# Patient Record
Sex: FEMALE | Employment: UNEMPLOYED | URBAN - METROPOLITAN AREA
[De-identification: names, ages, dates, MRNs, and addresses within clinical notes are randomized per-mention and may not be internally consistent; named-entity substitution may affect disease eponyms.]

---

## 2024-04-03 ENCOUNTER — OFFICE VISIT (OUTPATIENT)
Facility: CLINIC | Age: 29
End: 2024-04-03
Payer: MEDICAID

## 2024-04-03 ENCOUNTER — HOSPITAL ENCOUNTER (OUTPATIENT)
Facility: HOSPITAL | Age: 29
Setting detail: SPECIMEN
Discharge: HOME | End: 2024-04-06
Payer: MEDICAID

## 2024-04-03 VITALS
RESPIRATION RATE: 16 BRPM | WEIGHT: 182 LBS | TEMPERATURE: 98.5 F | BODY MASS INDEX: 28.56 KG/M2 | HEIGHT: 67 IN | DIASTOLIC BLOOD PRESSURE: 76 MMHG | SYSTOLIC BLOOD PRESSURE: 110 MMHG | OXYGEN SATURATION: 98 % | HEART RATE: 84 BPM

## 2024-04-03 DIAGNOSIS — Z00.00 WELL WOMAN EXAM (NO GYNECOLOGICAL EXAM): Primary | ICD-10-CM

## 2024-04-03 DIAGNOSIS — Z76.89 ENCOUNTER TO ESTABLISH CARE: ICD-10-CM

## 2024-04-03 DIAGNOSIS — G89.29 CHRONIC BILATERAL THORACIC BACK PAIN: ICD-10-CM

## 2024-04-03 DIAGNOSIS — K59.04 CHRONIC IDIOPATHIC CONSTIPATION: ICD-10-CM

## 2024-04-03 DIAGNOSIS — M54.6 CHRONIC BILATERAL THORACIC BACK PAIN: ICD-10-CM

## 2024-04-03 DIAGNOSIS — Z00.00 WELL WOMAN EXAM (NO GYNECOLOGICAL EXAM): ICD-10-CM

## 2024-04-03 PROBLEM — J45.909 ASTHMA: Status: ACTIVE | Noted: 2019-01-21

## 2024-04-03 PROBLEM — B00.9 HSV-1 (HERPES SIMPLEX VIRUS 1) INFECTION: Status: ACTIVE | Noted: 2021-08-23

## 2024-04-03 PROBLEM — F41.9 ANXIETY: Status: ACTIVE | Noted: 2021-08-23

## 2024-04-03 LAB
ALBUMIN SERPL-MCNC: 4.3 G/DL (ref 3.4–5)
ALBUMIN/GLOB SERPL: 1.4 (ref 0.8–1.7)
ALP SERPL-CCNC: 68 U/L (ref 45–117)
ALT SERPL-CCNC: 32 U/L (ref 13–56)
ANION GAP SERPL CALC-SCNC: 4 MMOL/L (ref 3–18)
AST SERPL-CCNC: 22 U/L (ref 10–38)
BILIRUB SERPL-MCNC: 0.5 MG/DL (ref 0.2–1)
BUN SERPL-MCNC: 6 MG/DL (ref 7–18)
BUN/CREAT SERPL: 10 (ref 12–20)
CALCIUM SERPL-MCNC: 9.5 MG/DL (ref 8.5–10.1)
CHLORIDE SERPL-SCNC: 107 MMOL/L (ref 100–111)
CO2 SERPL-SCNC: 27 MMOL/L (ref 21–32)
CREAT SERPL-MCNC: 0.62 MG/DL (ref 0.6–1.3)
ERYTHROCYTE [DISTWIDTH] IN BLOOD BY AUTOMATED COUNT: 13.2 % (ref 11.6–14.5)
GLOBULIN SER CALC-MCNC: 3.1 G/DL (ref 2–4)
GLUCOSE SERPL-MCNC: 83 MG/DL (ref 74–99)
HCT VFR BLD AUTO: 45.9 % (ref 35–45)
HGB BLD-MCNC: 15.1 G/DL (ref 12–16)
MCH RBC QN AUTO: 28.3 PG (ref 24–34)
MCHC RBC AUTO-ENTMCNC: 32.9 G/DL (ref 31–37)
MCV RBC AUTO: 86.1 FL (ref 78–100)
NRBC # BLD: 0 K/UL (ref 0–0.01)
NRBC BLD-RTO: 0 PER 100 WBC
PLATELET # BLD AUTO: 166 K/UL (ref 135–420)
PMV BLD AUTO: 12.1 FL (ref 9.2–11.8)
POTASSIUM SERPL-SCNC: 4.2 MMOL/L (ref 3.5–5.5)
PROT SERPL-MCNC: 7.4 G/DL (ref 6.4–8.2)
RBC # BLD AUTO: 5.33 M/UL (ref 4.2–5.3)
SODIUM SERPL-SCNC: 138 MMOL/L (ref 136–145)
WBC # BLD AUTO: 4.7 K/UL (ref 4.6–13.2)

## 2024-04-03 PROCEDURE — 99385 PREV VISIT NEW AGE 18-39: CPT | Performed by: NURSE PRACTITIONER

## 2024-04-03 PROCEDURE — 36415 COLL VENOUS BLD VENIPUNCTURE: CPT

## 2024-04-03 PROCEDURE — 85027 COMPLETE CBC AUTOMATED: CPT

## 2024-04-03 PROCEDURE — 80053 COMPREHEN METABOLIC PANEL: CPT

## 2024-04-03 RX ORDER — MEDROXYPROGESTERONE ACETATE 150 MG/ML
150 INJECTION, SUSPENSION INTRAMUSCULAR
COMMUNITY
Start: 2024-01-19

## 2024-04-03 ASSESSMENT — PATIENT HEALTH QUESTIONNAIRE - PHQ9
SUM OF ALL RESPONSES TO PHQ QUESTIONS 1-9: 0
2. FEELING DOWN, DEPRESSED OR HOPELESS: NOT AT ALL
SUM OF ALL RESPONSES TO PHQ QUESTIONS 1-9: 0
SUM OF ALL RESPONSES TO PHQ QUESTIONS 1-9: 0
SUM OF ALL RESPONSES TO PHQ9 QUESTIONS 1 & 2: 0
1. LITTLE INTEREST OR PLEASURE IN DOING THINGS: NOT AT ALL
SUM OF ALL RESPONSES TO PHQ QUESTIONS 1-9: 0

## 2024-04-03 NOTE — PROGRESS NOTES
[address] [place] [address]               [phone]      Corbin Matthews is a 28 y.o. female and presents with New Patient       Assessment/Plan:    1. Well woman exam (no gynecological exam)  -     Comprehensive Metabolic Panel; Future  -     CBC; Future  2. Chronic bilateral thoracic back pain  -     [facility]  3. Chronic idiopathic constipation  4. Encounter to establish care   Visit today to establish care, prior PCP was in [place]  Annual physical completed  Thoracic back pain: refer to PT  Does not want to see GI at this time for her chronic constipation  Labs today  Patient verbalized understanding and is in agreement with this plan of care      Follow up and disposition:   Return for 2-3 months weight loss 15min office .      Subjective:    Labs obtained prior to visit? No  Reviewed with patient? N/A    Visit today to establish care    ROS:     Review of Systems   Constitutional: Negative.    HENT: Negative.     Eyes: Negative.    Respiratory: Negative.     Cardiovascular: Negative.    Gastrointestinal:  Positive for constipation.        Constipation: 3 months ago worsened, long standing problem  Has been drinking tea to help with bm, but two days later the constipation is back  Uses miralax 2x a day: this does not help  Enema about once a month  Has never seen anyone for this problem   Genitourinary: Negative.    Musculoskeletal:  Positive for back pain.        Back pain: longstanding problem, pain is in upper back and shoulders  She is trying to lose weight because when her weight is lower her back pain is not as bad.  In the past she has taken ibuprofen 600-800mg or tylenol  Has been to chiropractor and had MRI, was going to get \"injections\" for her back but she moved. She remembers him saying something about a herniated disc   Skin: Negative.    Neurological: Negative.    Psychiatric/Behavioral:

## 2024-04-03 NOTE — PROGRESS NOTES
\"Have you been to the ER, urgent care clinic since your last visit?  Hospitalized since your last visit?\"    Yes- [facility] on 3/2024 for back, neck and shoulder pain    “Have you seen or consulted any other health care providers outside of [facility] since your last visit?”    NO     “Have you had a pap smear?”    YES - Where[facility]   No cervical cancer screening on file             Click Here for Release of Records Request

## 2024-07-31 ENCOUNTER — TELEMEDICINE (OUTPATIENT)
Facility: CLINIC | Age: 29
End: 2024-07-31

## 2024-07-31 DIAGNOSIS — F17.219 CIGARETTE NICOTINE DEPENDENCE WITH NICOTINE-INDUCED DISORDER: Primary | ICD-10-CM

## 2024-07-31 DIAGNOSIS — E66.3 OVERWEIGHT WITH BODY MASS INDEX (BMI) OF 29 TO 29.9 IN ADULT: ICD-10-CM

## 2024-07-31 RX ORDER — NICOTINE 21 MG/24HR
1 PATCH, TRANSDERMAL 24 HOURS TRANSDERMAL DAILY
Qty: 42 PATCH | Refills: 0 | Status: SHIPPED | OUTPATIENT
Start: 2024-07-31 | End: 2024-09-11

## 2024-07-31 SDOH — ECONOMIC STABILITY: FOOD INSECURITY: WITHIN THE PAST 12 MONTHS, THE FOOD YOU BOUGHT JUST DIDN'T LAST AND YOU DIDN'T HAVE MONEY TO GET MORE.: NEVER TRUE

## 2024-07-31 SDOH — ECONOMIC STABILITY: FOOD INSECURITY: WITHIN THE PAST 12 MONTHS, YOU WORRIED THAT YOUR FOOD WOULD RUN OUT BEFORE YOU GOT MONEY TO BUY MORE.: NEVER TRUE

## 2024-07-31 SDOH — ECONOMIC STABILITY: HOUSING INSECURITY
IN THE LAST 12 MONTHS, WAS THERE A TIME WHEN YOU DID NOT HAVE A STEADY PLACE TO SLEEP OR SLEPT IN A SHELTER (INCLUDING NOW)?: NO

## 2024-07-31 SDOH — ECONOMIC STABILITY: INCOME INSECURITY: HOW HARD IS IT FOR YOU TO PAY FOR THE VERY BASICS LIKE FOOD, HOUSING, MEDICAL CARE, AND HEATING?: NOT HARD AT ALL

## 2024-07-31 NOTE — PROGRESS NOTES
Corbin Matthews, was evaluated through a synchronous (real-time) audio-video encounter. The patient (or guardian if applicable) is aware that this is a billable service, which includes applicable co-pays. This Virtual Visit was conducted with patient's (and/or legal guardian's) consent. Patient identification was verified, and a caregiver was present when appropriate.   The patient was located at Home (Appt Dept State): [place]  Provider was located at Facility (Appt Dept): [address] [place] [address], [address]  [place] [address]  Confirm you are appropriately licensed, registered, or certified to deliver care in the state where the patient is located as indicated above. If you are not or unsure, please re-schedule the visit: Yes, I confirm.     Corbin Matthews (:  1995) is a Established patient, presenting virtually for evaluation of the following:    Assessment & Plan   Below is the assessment and plan developed based on review of pertinent history, physical exam, labs, studies, and medications.  1. Cigarette nicotine dependence with nicotine-induced disorder  -     nicotine (NICODERM CQ) 21 MG/24HR; Place 1 patch onto the skin daily, Disp-42 patch, R-0Normal  2. Overweight with body mass index (BMI) of 29 to 29.9 in adult  -     Phentermine-Topiramate 3.75-23 MG CP24; Take 1 capsule by mouth daily for 30 days. Max Daily Amount: 1 capsule, Disp-30 capsule, R-0Normal  Wishes to quit smoking, Rx for nicoderm patch provided, counseled on proper usage and disposal of patches  Trying to lose weight, would like medication assistance, try qsymia  Patient verbalized understanding and is in agreement with this plan of care    Return in about 6 months (around 2025) for weight loss and smoking cessation, 15min, office.       Subjective   HPI    Weight loss  Last visit we discussed different PO medications for weight loss  States current home weight is 189  Has tried phentermine in the past, 37.5mg, did not work for

## 2024-07-31 NOTE — PATIENT INSTRUCTIONS
Physical Therapy  Where: [facility]  Address: [address] [place] [address]  [address]  Phone: [phone]

## 2024-07-31 NOTE — PROGRESS NOTES
\"Have you been to the ER, urgent care clinic since your last visit?  Hospitalized since your last visit?\"    NO    “Have you seen or consulted any other health care providers outside of [facility] since your last visit?”    Yes- OB GYN for birth control   “Have you had a pap smear?”    NO    No cervical cancer screening on file             Click Here for Release of Records Request

## 2024-08-13 ENCOUNTER — OFFICE VISIT (OUTPATIENT)
Facility: CLINIC | Age: 29
End: 2024-08-13

## 2024-08-13 ENCOUNTER — TELEPHONE (OUTPATIENT)
Facility: CLINIC | Age: 29
End: 2024-08-13

## 2024-08-13 VITALS
WEIGHT: 182 LBS | TEMPERATURE: 98.2 F | RESPIRATION RATE: 18 BRPM | HEIGHT: 67 IN | SYSTOLIC BLOOD PRESSURE: 93 MMHG | DIASTOLIC BLOOD PRESSURE: 60 MMHG | HEART RATE: 87 BPM | OXYGEN SATURATION: 96 % | BODY MASS INDEX: 28.56 KG/M2

## 2024-08-13 DIAGNOSIS — M54.9 SPINE PAIN, MULTILEVEL: Primary | ICD-10-CM

## 2024-08-13 RX ORDER — METHYLPREDNISOLONE 4 MG
TABLET, DOSE PACK ORAL
COMMUNITY
Start: 2024-05-23

## 2024-08-13 RX ORDER — TRAMADOL HYDROCHLORIDE 50 MG/1
50 TABLET ORAL EVERY 6 HOURS PRN
Qty: 30 TABLET | Refills: 0 | Status: SHIPPED | OUTPATIENT
Start: 2024-08-13 | End: 2024-08-21

## 2024-08-13 RX ORDER — CYCLOBENZAPRINE HCL 10 MG
10 TABLET ORAL 3 TIMES DAILY PRN
COMMUNITY

## 2024-08-13 ASSESSMENT — PATIENT HEALTH QUESTIONNAIRE - PHQ9
SUM OF ALL RESPONSES TO PHQ QUESTIONS 1-9: 0
2. FEELING DOWN, DEPRESSED OR HOPELESS: NOT AT ALL
SUM OF ALL RESPONSES TO PHQ QUESTIONS 1-9: 0
1. LITTLE INTEREST OR PLEASURE IN DOING THINGS: NOT AT ALL
SUM OF ALL RESPONSES TO PHQ QUESTIONS 1-9: 0
SUM OF ALL RESPONSES TO PHQ9 QUESTIONS 1 & 2: 0
SUM OF ALL RESPONSES TO PHQ QUESTIONS 1-9: 0

## 2024-08-13 NOTE — PROGRESS NOTES
Room 6     Corbin Matthews had concerns including Pain (Patient states for neck to tail bone ). for today's visit .     When asked if patient has any concerns she would like to address with NENITA Slater patient states I am having neck pain that radiating to my tail bone . Patient states the pain started last Saturday . NENITA Slater has been notified  of patient concerns .        1. \"Have you been to the ER, urgent care clinic since your last visit?  Hospitalized since your last visit?\" Patient states I went to [facility] on [address] due to body pain     2. \"Have you seen or consulted any other health care providers outside of the [facility] since your last visit?\" No     3. For patients aged 45-75: Has the patient had a colonoscopy / FIT/ Cologuard? N/A      If the patient is female:    4. For patients aged 40-74: Has the patient had a mammogram within the past 2 years? N/A      5. For patients aged 21-65: Has the patient had a pap smear? {Cancer Care Gap present? Yes           8/13/2024     9:26 AM   PHQ-9    Little interest or pleasure in doing things 0   Feeling down, depressed, or hopeless 0   PHQ-2 Score 0   PHQ-9 Total Score 0          Health Maintenance Due   Topic Date Due    Pneumococcal 0-64 years Vaccine (1 of 2 - PCV) Never done    Hepatitis B vaccine (1 of 3 - 19+ 3-dose series) Never done    Cervical cancer screen  Never done    COVID-19 Vaccine (2 - 2023-24 season) 09/01/2023    Annual Wellness Visit (Medicare Advantage)  Never done    Flu vaccine (1) 08/01/2024

## 2024-08-13 NOTE — PROGRESS NOTES
[address] [place] [address]               [phone]      Corbin Matthews is a 29 y.o. female and presents with Pain (Patient states for neck to tail bone )       Assessment/Plan:    1. Spine pain, multilevel  -     XR LUMBAR SPINE (2-3 VIEWS); Future  -     XR SACRUM COCCYX (MIN 2 VIEWS); Future  -     [facility] - Charan Gonzalez MD, Physical Medicine & Rehab, [facility])  -     traMADol (ULTRAM) 50 MG tablet; Take 1 tablet by mouth every 6 hours as needed for Pain for up to 8 days. Intended supply: 5 days. Take lowest dose possible to manage pain Max Daily Amount: 200 mg, Disp-30 tablet, R-0Normal   Endorses back pain in the throacic and lumbar spine, causing a chronic headache  Obtain x-rays today  Refer to PMR  Ultram for pain, one time order  Patient verbalized understanding and is in agreement with this plan of care        Follow up and disposition:   Return in about 8 months (around 4/13/2025) for Physical, w/ogyn, 30min, office, w/labsprior.      Subjective:    Labs obtained prior to visit? No  Reviewed with patient? N/A    Back pain  Started 8/3/24  Started in the tailbone and has progressed up to the neck (including hips and shoulders) causing a chronic headache  Went to urgent care 8/5/24 was given flexeril and prednisone  No x-rays were taken  Labs were completed to rule out RA or lupus: she has not heard anything back  Denies any trauma prior to the problem starting  Endorses pain to her wrists also  States no relief with the prednisone or flexeril  She was referred to rheumatology, does not have appointment   PMH: long standing problem of back pain w/ MRI in the past about 3 years ago    ROS:     Review of Systems  As stated in HPI, otherwise all others negative.       The problem list was updated as a part of today's visit.  Patient Active Problem List   Diagnosis    Anxiety    Asthma    HSV-1 (herpes simplex virus 1) infection       The PS,

## 2024-09-03 ENCOUNTER — TELEPHONE (OUTPATIENT)
Facility: CLINIC | Age: 29
End: 2024-09-03

## 2024-09-03 ENCOUNTER — TELEPHONE (OUTPATIENT)
Age: 29
End: 2024-09-03

## 2024-09-03 NOTE — TELEPHONE ENCOUNTER
Patient is a New Patient and is having so much pain from her Neck to her tail bone and wants to know if there was any sooner appts. I have advised that she has the soonest appt. 9/26 Patient wanted to know if some exception could be made due to this pain. Patient is unable to sleep at night Unable to walk ok, her PCP  is giving her Tramadol for the pain..    Please advise patient @ [phone]

## 2024-09-04 ENCOUNTER — OFFICE VISIT (OUTPATIENT)
Age: 29
End: 2024-09-04
Payer: MEDICAID

## 2024-09-04 VITALS
HEART RATE: 96 BPM | DIASTOLIC BLOOD PRESSURE: 68 MMHG | BODY MASS INDEX: 28.56 KG/M2 | WEIGHT: 182 LBS | HEIGHT: 67 IN | SYSTOLIC BLOOD PRESSURE: 107 MMHG | OXYGEN SATURATION: 98 % | TEMPERATURE: 98.3 F

## 2024-09-04 DIAGNOSIS — M35.3 POLYMYALGIA (HCC): Primary | ICD-10-CM

## 2024-09-04 DIAGNOSIS — M54.6 PAIN IN THORACIC SPINE: ICD-10-CM

## 2024-09-04 DIAGNOSIS — M54.50 LUMBAR PAIN DETERMINED BY PALPATION: ICD-10-CM

## 2024-09-04 DIAGNOSIS — G47.8 NON-RESTORATIVE SLEEP: ICD-10-CM

## 2024-09-04 DIAGNOSIS — M54.2 NECK PAIN: ICD-10-CM

## 2024-09-04 PROCEDURE — G8427 DOCREV CUR MEDS BY ELIG CLIN: HCPCS | Performed by: PHYSICAL MEDICINE & REHABILITATION

## 2024-09-04 PROCEDURE — 72040 X-RAY EXAM NECK SPINE 2-3 VW: CPT | Performed by: PHYSICAL MEDICINE & REHABILITATION

## 2024-09-04 PROCEDURE — 72070 X-RAY EXAM THORAC SPINE 2VWS: CPT | Performed by: PHYSICAL MEDICINE & REHABILITATION

## 2024-09-04 PROCEDURE — 4004F PT TOBACCO SCREEN RCVD TLK: CPT | Performed by: PHYSICAL MEDICINE & REHABILITATION

## 2024-09-04 PROCEDURE — 99204 OFFICE O/P NEW MOD 45 MIN: CPT | Performed by: PHYSICAL MEDICINE & REHABILITATION

## 2024-09-04 PROCEDURE — G8419 CALC BMI OUT NRM PARAM NOF/U: HCPCS | Performed by: PHYSICAL MEDICINE & REHABILITATION

## 2024-09-04 RX ORDER — PREGABALIN 50 MG/1
CAPSULE ORAL
Qty: 60 CAPSULE | Refills: 2 | Status: SHIPPED | OUTPATIENT
Start: 2024-09-04 | End: 2024-12-04

## 2024-09-04 RX ORDER — CONJUGATED ESTROGENS 0.62 MG/G
CREAM VAGINAL
COMMUNITY
Start: 2024-07-30

## 2024-09-04 RX ORDER — TRAMADOL HYDROCHLORIDE 50 MG/1
50 TABLET ORAL NIGHTLY PRN
COMMUNITY

## 2024-09-04 NOTE — PROGRESS NOTES
Corbin Matthews presents today for   Chief Complaint   Patient presents with    Neck Pain    Back Pain     Mid and lower       Is someone accompanying this pt? Yes, little girl     Is the patient using any DME equipment during OV? no      Coordination of Care:  1. Have you been to the ER, urgent care clinic since your last visit? Yes, pt went to urgent care for her back pain Hospitalized since your last visit? no    2. Have you seen or consulted any other health care providers outside of the [facility] since your last visit? Yes, pcp  Include any pap smears or colon screening. no      
Arm, Position: Sitting, Cuff Size: Medium Adult)   Pulse 96   Temp 98.3 °F (36.8 °C) (Oral)   Ht 1.702 m (5' 7\")   Wt 82.6 kg (182 lb)   SpO2 98% Comment: RA  BMI 28.51 kg/m²     Well-groomed young lady accompanied by  daughter  TTP: Diffusely cervical paraspinals, upper trapezii, levator scapula, thoracic paraspinals, lumbar paraspinals  UE strength: Manual muscle testing intact deltoids, triceps, biceps, wrist extensors, wrist flexors, intrinsics  UE DTR: 1+ biceps, triceps, brachioradialis      LE strength: Hip flexors, hamstrings, quads, dorsiflexors, plantar flexors intact  SLR: Negative  LE DTR: 2+ patella  No edema     Ambulatory without assistive device, nonantalgic gait  Minimal difficulty with tandem gait.      Past Medical History:   Diagnosis Date    Asthma          Past Surgical History:   Procedure Laterality Date     SECTION      x1         Current Outpatient Medications   Medication Sig Dispense Refill    PREMARIN 0.625 MG/GM CREA vaginal cream insert 0.5 grams vaginally once daily for 3 WEEKS then STOP FOR 1 WEEK. MAY REPEAT AS NEEDED      traMADol (ULTRAM) 50 MG tablet Take 1 tablet by mouth nightly as needed for Pain. Max Daily Amount: 50 mg      pregabalin (LYRICA) 50 MG capsule First month ramp instructions: 1 p.o. nightly x1 week.  Increase to 1 p.o. twice daily as tolerated thereafter 60 capsule 2    nicotine (NICODERM CQ) 21 MG/24HR Place 1 patch onto the skin daily 42 patch 0     No current facility-administered medications for this visit.           I, Juliann Hamilton MD, personally performed the services described in this documentation. I have authorized the scribe to complete the medical record entries input within this chart. I have reviewed the chart and agree that the record reflects my personal performance and is accurate and complete. [Electronically Signed: Juliann Hamilton MD. 2024 7:09 PM EDT ]    This note was created using Dragon transcription software and

## 2024-09-04 NOTE — PATIENT INSTRUCTIONS
Eat to Reduce Inflammation      Eat more:  Fruits and Veggies  Leafy, green vegetables like spinach  Any veggies you love, especially onions and cabbage  Fruit, especially berries, apples, pears, and citrus  Avocado  Carbs  Beans and legumes  100% whole grain bread, oats, quinoa, brown and wild rice, millet, and corn  Proteins and Fats  Nuts and seeds, and nut butters (especially walnuts, flax seed, and carl seeds)  Olive oil  Fatty fish, such as salmon, tuna, sardines, and trout  Eggs (especially omega-3 eggs)  Small amounts of grass-fed meats  Herbs and Spices  Ginger and garlic (fresh or dried)  Spices, especially turmeric and cinnamon  Herbs, especially rosemary, thyme, and basil  Red chili peppers  Eat less:  Foods  Baked goods made with white flour, added sugar, and vegetables oils like white bread, cookies, cakes, or pastries  Frozen or boxed meals like pizza, macaroni and cheese, or seasoned pasta meals  Snack foods made with refined carbs or vegetable oils like crackers or chips  Foods high in added sugar like candy or ice cream  French fries and other fried foods  Drinks  Sugar sweetened beverages like soda, sports drinks, energy drinks, coffee drinks, or sweet tea  Alcohol (yes, even red wine!)            © 2022 [employer] All rights reserved.

## 2024-09-10 ENCOUNTER — TELEPHONE (OUTPATIENT)
Facility: CLINIC | Age: 29
End: 2024-09-10

## 2024-09-10 NOTE — TELEPHONE ENCOUNTER
Called to inform patient that Prior Auth has been completed . Patient states she is aware and insurance has denied coverage of medications . Patient states the medication has be ordered separately. Patient verbalized understanding .

## 2024-09-25 ENCOUNTER — TELEPHONE (OUTPATIENT)
Facility: CLINIC | Age: 29
End: 2024-09-25

## 2024-09-25 NOTE — TELEPHONE ENCOUNTER
Called patient back in regards of needing a prior auth for the weight loss medication Qsymia . Patient verbalized understanding.

## 2024-09-25 NOTE — TELEPHONE ENCOUNTER
Kody Tavares,     Mrs. Williamson is calling about a medication Qsymia 37.5 MG but I don't see that on her medicarion list. Could you please give her a call. Thanks

## 2024-10-16 ENCOUNTER — TELEPHONE (OUTPATIENT)
Facility: CLINIC | Age: 29
End: 2024-10-16

## 2024-10-16 NOTE — TELEPHONE ENCOUNTER
Kody Tavares,    Will you please call Mrs. Matthews because I can't answer her question about a medication that is not on her medication list.    Thanks

## 2024-10-18 DIAGNOSIS — E66.3 OVERWEIGHT WITH BODY MASS INDEX (BMI) OF 29 TO 29.9 IN ADULT: Primary | ICD-10-CM

## 2024-10-18 RX ORDER — TOPIRAMATE 25 MG/1
25 TABLET, FILM COATED ORAL DAILY
Qty: 90 TABLET | Refills: 1 | Status: SHIPPED | OUTPATIENT
Start: 2024-10-18

## 2024-10-18 NOTE — TELEPHONE ENCOUNTER
Returned call.  Advised that since she could not get the qsymia I went ahead and sent Rxs for phentermine and Topamax separately. Patient verbalized understanding and is in agreement with this plan of care

## 2024-10-24 ENCOUNTER — TELEPHONE (OUTPATIENT)
Age: 29
End: 2024-10-24

## 2024-10-24 DIAGNOSIS — M54.2 NECK PAIN: Primary | ICD-10-CM

## 2024-10-24 RX ORDER — PREGABALIN 100 MG/1
100 CAPSULE ORAL 2 TIMES DAILY
Qty: 60 CAPSULE | Refills: 1 | Status: SHIPPED | OUTPATIENT
Start: 2024-10-24 | End: 2024-12-23

## 2024-10-24 NOTE — TELEPHONE ENCOUNTER
I called and spoke with the pt I let her know the Taper instructions of taking 50 mg in the morning and 100 mgs in the evening and after day 7 she will take 100 mg in the morning as well as 100 mg in the evening. Pt verbalized understanding and will call the office if she has any further questions or concerns.

## 2024-10-24 NOTE — TELEPHONE ENCOUNTER
Patient called in and stated that she's having extreme neck and upper back pain.    She's asking can another medication be prescribed because the Pregabalin isn't helping her at all and the pain is so bad it brings tears to her eyes.    Please advise.    The patient is req a call when something has been sent to the pharmacy.    Pharmacy:    [id number] - [place], [place] - [address] [facility] -  [phone] - F [phone] [address] [address]  Phone: [phone]  Fax: [phone]

## 2024-11-11 ENCOUNTER — TELEPHONE (OUTPATIENT)
Age: 29
End: 2024-11-11

## 2024-11-11 NOTE — TELEPHONE ENCOUNTER
I called and spoke to the pt. The pt was identified using 2 pt identifiers. She was notified that her visit can be switched to a telephone visit but that we would need to move it to a later time in the day, due to provider preference. The pt was offered a time of 4:20 pm. She accepted this and was made aware of how the telephone visit process works. No questions or concerns voiced.

## 2024-11-13 ENCOUNTER — SCHEDULED TELEPHONE ENCOUNTER (OUTPATIENT)
Age: 29
End: 2024-11-13
Payer: MEDICAID

## 2024-11-13 DIAGNOSIS — M54.2 NECK PAIN: Primary | ICD-10-CM

## 2024-11-13 DIAGNOSIS — M35.3 POLYMYALGIA (HCC): ICD-10-CM

## 2024-11-13 DIAGNOSIS — E66.3 OVERWEIGHT WITH BODY MASS INDEX (BMI) OF 29 TO 29.9 IN ADULT: ICD-10-CM

## 2024-11-13 DIAGNOSIS — M54.2 NECK PAIN: ICD-10-CM

## 2024-11-13 PROCEDURE — 99214 OFFICE O/P EST MOD 30 MIN: CPT | Performed by: PHYSICAL MEDICINE & REHABILITATION

## 2024-11-13 RX ORDER — TOPIRAMATE 25 MG/1
TABLET, FILM COATED ORAL
Qty: 90 TABLET | Refills: 1 | Status: SHIPPED | OUTPATIENT
Start: 2024-11-13

## 2024-11-13 NOTE — PROGRESS NOTES
[address] [facility], [address]  [place] [address]  Phone: [phone]  Fax: [phone]      Corbin Matthews, was evaluated through a synchronous (real-time) audio-video encounter. The patient (or guardian if applicable) is aware that this is a billable service, which includes applicable co-pays. This Virtual Visit was conducted with patient's (and/or legal guardian's) consent. Patient identification was verified, and a caregiver was present when appropriate.   The patient was located at Home: [address] [place] [address]  Provider was located at Facility (Appt Dept): [address] [facility]  [address]  [place] [address]  Confirm you are appropriately licensed, registered, or certified to deliver care in the state where the patient is located as indicated above. If you are not or unsure, please re-schedule the visit: Yes, I confirm.         Corbin Matthews is a 29 y.o. female evaluated via patient initiated telephone call on 11/13/2024.      Corbin was seen today for neck pain and back pain.    Diagnoses and all orders for this visit:    Neck pain  -     MRI CERVICAL SPINE WO CONTRAST; Future  -     topiramate (TOPAMAX) 25 MG tablet; One po bid 25mg x 1week, then increase to 25mg qd and 50 po qhs    Overweight with body mass index (BMI) of 29 to 29.9 in adult    Polymyalgia (HCC)  -     topiramate (TOPAMAX) 25 MG tablet; One po bid 25mg x 1week, then increase to 25mg qd and 50 po qhs         Corbin Matthews is a 29 y.o. female with a history of chronic neck pain reporting increased severity with headaches and radiation into the shoulders and mid back.  Ordered C MRI.  Patient given physician-directed cervical and scapular exercises to perform as tolerated. Discussed postural awareness and chin tuck exercise.  Decrease Lyrica to 150mg QD x 1 week then discontinue due to concerns about headache.  Ramp Topamax to 25mg QAM and 50mg QHS.       Follow-up and Dispositions    Return for fu

## 2024-11-13 NOTE — PROGRESS NOTES
Corbin Matthews presents today for   Chief Complaint   Patient presents with    Neck Pain    Back Pain     upper       Is someone accompanying this pt? no    Is the patient using any DME equipment during OV? no    Coordination of Care:  1. Have you been to the ER, urgent care clinic since your last visit? no  Hospitalized since your last visit? no    2. Have you seen or consulted any other health care providers outside of the [facility] since your last visit? no Include any pap smears or colon screening. no

## 2024-11-13 NOTE — PATIENT INSTRUCTIONS
your other arm.  Shoulder stretch    Relax your shoulders.  Raise one arm to shoulder height, and reach it across your chest.  Pull the arm slightly toward you with your other arm. This will help you get a gentle stretch. Hold for about 6 seconds.  Repeat 2 to 4 times.  Shoulder blade squeeze    Sit or stand up tall with your arms at your sides.  Keep your shoulders relaxed and down, not shrugged.  Squeeze your shoulder blades together. Hold for 6 seconds, then relax.  Repeat 8 to 12 times.  Straight-arm shoulder blade squeeze    Sit or stand tall. Relax your shoulders.  With palms down, hold your elastic tubing or band straight out in front of you.  Start with slight tension in the tubing or band, with your hands about shoulder-width apart.  Slowly pull straight out to the sides, squeezing your shoulder blades together. Keep your arms straight and at shoulder height. Slowly release.  Repeat 8 to 12 times.  Rowing    [place] your elastic tubing or band at about waist height. Take one end in each hand.  Sit or stand with your feet hip-width apart.  Hold your arms straight in front of you. Adjust your distance to create slight tension in the tubing or band.  Slightly tuck your chin. Relax your shoulders.  Without shrugging your shoulders, pull straight back. Your elbows will pass alongside your waist.  Pull-downs    [place] your elastic tubing or band in the top of a closed door. Take one end in each hand.  Either sit or stand, depending on what is more comfortable. If you feel unsteady, sit on a chair.  Start with your arms up and comfortably apart, elbows straight. There should be a slight tension in the tubing or band.  Slightly tuck your chin, and look straight ahead.  Keeping your back straight, slowly pull down and back, bending your elbows.  Stop where your hands are level with your chin, in a \"goalpost\" position.  Repeat 8 to 12 times.  Chest T stretch    Lie on your back. Raise your knees so they are bent.

## 2024-11-15 RX ORDER — TOPIRAMATE 25 MG/1
TABLET, FILM COATED ORAL
Qty: 270 TABLET | Refills: 1 | OUTPATIENT
Start: 2024-11-15

## 2024-11-30 ENCOUNTER — HOSPITAL ENCOUNTER (OUTPATIENT)
Facility: HOSPITAL | Age: 29
Discharge: HOME | End: 2024-12-03
Attending: PHYSICAL MEDICINE & REHABILITATION
Payer: MEDICAID

## 2024-11-30 DIAGNOSIS — M54.2 NECK PAIN: ICD-10-CM

## 2024-11-30 PROCEDURE — 72141 MRI NECK SPINE W/O DYE: CPT

## 2024-12-27 ENCOUNTER — TELEPHONE (OUTPATIENT)
Age: 29
End: 2024-12-27

## 2024-12-27 DIAGNOSIS — M35.3 POLYMYALGIA (HCC): Primary | ICD-10-CM

## 2024-12-27 NOTE — TELEPHONE ENCOUNTER
Patient called in and stated that she has been having the worst back pain and the Pregabalin is not helping at all.. Patient is scheduled to see Dr. Hamilton 2/5    Please advise patient @ [phone]

## 2024-12-27 NOTE — TELEPHONE ENCOUNTER
Called patient [phone] and verified her name and date of birth. I informed her per the provider that she can increase the Topamax up to taking 50 mg 2-25 mg tablets twice a day and see how that helps. Patients had her MRI Cervical spine done on 11/30/2024. She inquired about the results. I informed her that she would have to speak to the provider in regards to the results or she can review it through her My Chart. She stated she did look at it but she does not understand it. I informed her we can put her on the cancellation list just in case there is any cancellations and I also informed her that she could also try calling back to check to see if there are any earlier openings. Patient stated she will do that as well. She stated she has tried cyclobenzaprine but did not feel like it helped but would be willing to try a different muscle relaxer. Please advise.

## 2024-12-27 NOTE — TELEPHONE ENCOUNTER
Called patient [phone] and verified her name and date of birth. I informed her the provider has sent in Tizandine 4 mg take 1 po tid prn and to be aware it may cause sedation. Patient asked if its not a muscle issue will it help. I informed her that it may take the edge off things until we can get her in to be seen. Patient verbalized understanding and no further action needed at this time.

## 2024-12-27 NOTE — TELEPHONE ENCOUNTER
I spoke to NP Susana Evans and she inquired if the patient had recently had a Medrol Dose pack. I informed her no. She offered to send in a Medrol Dose Pack #1 take as directed with no refills.    Called patient [phone] and verified her name and date of birth. Patient stated this is her same regular pain and it changes from her her neck to her low back and then her mid back. She stated the medication is not helping. I inquired which medication is she currently taking because the last prescription sent was Topamax. Patient stated yes they changed it from Pregablin because it was not working and she was then switched to Topamax but her PCP already had her on this for weight loss.. She stated this is not working either. I inquired how was she currently taking the Topamax because the instructions we have down is 25 mg qd and 50 mg q hs. She stated yes that way but its not helping with her pain. She also stated she just finished taking a Medrol Dose pack. She stated she received it from an urgent care or ER visit a couple months ago but she started to feel better after the visit and just kept that on hand and she started it mid last week and just finished it. I informed her that I will forward the message to the provider and once I get a response. Someone will contact her. Patient verbalized understanding. Please advise.

## 2024-12-27 NOTE — TELEPHONE ENCOUNTER
She can increase the topamax to 50 mg bid.  She needs to get the MRI cervical done.  The only other thing I can offer is a muscle relaxer.